# Patient Record
Sex: FEMALE | Race: BLACK OR AFRICAN AMERICAN | NOT HISPANIC OR LATINO
[De-identification: names, ages, dates, MRNs, and addresses within clinical notes are randomized per-mention and may not be internally consistent; named-entity substitution may affect disease eponyms.]

---

## 2021-04-27 PROBLEM — Z00.00 ENCOUNTER FOR PREVENTIVE HEALTH EXAMINATION: Status: ACTIVE | Noted: 2021-04-27

## 2022-06-17 ENCOUNTER — APPOINTMENT (OUTPATIENT)
Dept: ULTRASOUND IMAGING | Facility: CLINIC | Age: 57
End: 2022-06-17
Payer: COMMERCIAL

## 2022-06-17 PROCEDURE — 76641 ULTRASOUND BREAST COMPLETE: CPT | Mod: 50

## 2023-01-17 ENCOUNTER — APPOINTMENT (OUTPATIENT)
Dept: MAMMOGRAPHY | Facility: CLINIC | Age: 58
End: 2023-01-17

## 2023-01-19 ENCOUNTER — APPOINTMENT (OUTPATIENT)
Dept: MAMMOGRAPHY | Facility: CLINIC | Age: 58
End: 2023-01-19
Payer: COMMERCIAL

## 2023-01-19 PROCEDURE — 77063 BREAST TOMOSYNTHESIS BI: CPT

## 2023-01-19 PROCEDURE — 77067 SCR MAMMO BI INCL CAD: CPT

## 2023-06-13 ENCOUNTER — APPOINTMENT (OUTPATIENT)
Dept: ULTRASOUND IMAGING | Facility: CLINIC | Age: 58
End: 2023-06-13
Payer: COMMERCIAL

## 2023-06-13 PROCEDURE — 76641 ULTRASOUND BREAST COMPLETE: CPT | Mod: 50

## 2024-01-09 ENCOUNTER — APPOINTMENT (OUTPATIENT)
Dept: MAMMOGRAPHY | Facility: CLINIC | Age: 59
End: 2024-01-09
Payer: COMMERCIAL

## 2024-01-09 PROCEDURE — 77067 SCR MAMMO BI INCL CAD: CPT

## 2024-01-09 PROCEDURE — 77063 BREAST TOMOSYNTHESIS BI: CPT

## 2024-02-01 ENCOUNTER — TRANSCRIPTION ENCOUNTER (OUTPATIENT)
Age: 59
End: 2024-02-01

## 2024-02-01 NOTE — ASU PATIENT PROFILE, ADULT - NS PREOP UNDERSTANDS INFO
Spoke to patient to be NPO/No solid  foods after  12Mn, allow to drink clear liquids  till 4 am , dress comfortable, leave all valuable at home,  no lotions, no jewelry,  Bring ID photo and insurance cards,  escort arranged, address and telephone  given to patient  to call as needed./yes

## 2024-02-01 NOTE — ASU PATIENT PROFILE, ADULT - NSICDXPASTSURGICALHX_GEN_ALL_CORE_FT
PAST SURGICAL HISTORY:  No significant past surgical history PAST SURGICAL HISTORY:  H/O:      History of D&C

## 2024-02-02 ENCOUNTER — TRANSCRIPTION ENCOUNTER (OUTPATIENT)
Age: 59
End: 2024-02-02

## 2024-02-02 ENCOUNTER — OUTPATIENT (OUTPATIENT)
Dept: OUTPATIENT SERVICES | Facility: HOSPITAL | Age: 59
LOS: 1 days | Discharge: HOME | End: 2024-02-02
Payer: COMMERCIAL

## 2024-02-02 VITALS
RESPIRATION RATE: 16 BRPM | OXYGEN SATURATION: 98 % | SYSTOLIC BLOOD PRESSURE: 122 MMHG | TEMPERATURE: 98 F | HEART RATE: 74 BPM | DIASTOLIC BLOOD PRESSURE: 74 MMHG

## 2024-02-02 VITALS
HEART RATE: 73 BPM | OXYGEN SATURATION: 99 % | SYSTOLIC BLOOD PRESSURE: 142 MMHG | DIASTOLIC BLOOD PRESSURE: 94 MMHG | WEIGHT: 162.26 LBS | RESPIRATION RATE: 16 BRPM | TEMPERATURE: 98 F | HEIGHT: 62 IN

## 2024-02-02 DIAGNOSIS — Z98.891 HISTORY OF UTERINE SCAR FROM PREVIOUS SURGERY: Chronic | ICD-10-CM

## 2024-02-02 DIAGNOSIS — N84.0 POLYP OF CORPUS UTERI: ICD-10-CM

## 2024-02-02 DIAGNOSIS — Z98.890 OTHER SPECIFIED POSTPROCEDURAL STATES: Chronic | ICD-10-CM

## 2024-02-02 PROCEDURE — 88305 TISSUE EXAM BY PATHOLOGIST: CPT | Mod: 26

## 2024-02-02 RX ORDER — ONDANSETRON 8 MG/1
4 TABLET, FILM COATED ORAL ONCE
Refills: 0 | Status: DISCONTINUED | OUTPATIENT
Start: 2024-02-02 | End: 2024-02-02

## 2024-02-02 RX ORDER — SODIUM CHLORIDE 9 MG/ML
1000 INJECTION, SOLUTION INTRAVENOUS
Refills: 0 | Status: DISCONTINUED | OUTPATIENT
Start: 2024-02-02 | End: 2024-02-02

## 2024-02-02 RX ORDER — FENTANYL CITRATE 50 UG/ML
25 INJECTION INTRAVENOUS
Refills: 0 | Status: DISCONTINUED | OUTPATIENT
Start: 2024-02-02 | End: 2024-02-02

## 2024-02-02 RX ORDER — ACETAMINOPHEN 500 MG
1000 TABLET ORAL ONCE
Refills: 0 | Status: COMPLETED | OUTPATIENT
Start: 2024-02-02 | End: 2024-02-02

## 2024-02-02 RX ADMIN — Medication 1000 MILLIGRAM(S): at 13:29

## 2024-02-02 RX ADMIN — SODIUM CHLORIDE 100 MILLILITER(S): 9 INJECTION, SOLUTION INTRAVENOUS at 16:50

## 2024-02-02 NOTE — ASU DISCHARGE PLAN (ADULT/PEDIATRIC) - NS MD DC FALL RISK RISK
For information on Fall & Injury Prevention, visit: [email or web address] OR  [email or web address] OR  https://www.cdc.gov/steadi/patient.html

## 2024-02-02 NOTE — PRE-ANESTHESIA EVALUATION ADULT - NSANTHOBSERVEDRD_ENT_A_CORE
8/10/2023         RE: Per Etienne  [address] [facility] [address]        Dear Colleague,    Thank you for referring your patient, Per Etienne, to the [facility]. Please see a copy of my visit note below.    Oncology Follow-up:  Date on this visit: Aug 10, 2023    Primary care physician: Per Chen   Urologist: Dr. Angel Colmenares     Metastatic hormone sensitive prostate cancer    Oncologic History:  1. Metastatic hormone sensitive prostate cancer  He presented to his PCP in December 2019 with slow urinary stream.  PSA was checked and was found to be 124. On January 8, 2020 abdomen pelvis CT showed groundglass opacity in the left lower lobe,  dystrophic calcification of the prostate gland, and a fatty liver.  Same-day bone scan was negative. On January 10, 2010 prostate biopsy showed on the left Robyn score 4+3 involving 7 cores and on the right [place] score 3+4 involving 6 cores, with positive perineural invasion.  He proceeded to undergo RRP by Dr. Colmenares on February 24, 2020.  Pathology revealed acinar adenocarcinoma Robyn 4+3 with focal ROBERTO, bladder neck base invasion, positive SVI, positive PNI, and multiple positive margins.  There was no LVSI and 0 out of 3 lymph nodes removed were involved with tumor; pT3bN0.  He received Eligard on 4/20/2020.  MRI prostate on 5/21/2020 was concerning for local recurrence in the prostatectomy bed and pelvic lymph nodes.  He had a PET/CT F-18 fluciclovine PET/CT on 5/20/2020 which showed 2 foci of increased uptake in the left ischium, with underlying  sclerotic lesion, and symphysis pubis, additionally low-level increased uptake in 3 lymph nodes along the right external and internal iliac nodes, and prostatectomy bed was noted.    On April 21, 2020 he received an Eligard injection.    He started Apalutamide on 6/12/2020.  Bone scan 1/22/2021: no bone mets.      2. Tinnitus b/l -  By 12/2020 he presented with 2  No months c/o bilateral tinnitus and was evaluated by Dr. Shields from ENT. An audiogram showed a significant down-sloping mid to high frequency sensorineural hearing loss. There was no evidence of conductive hearing loss or significant asymmetry.  He was felt to have early onset presbycusis. He was referred for tinnitus retraining therapy.  Review of literature showed no clear association of apalutamide or Xgeva with tinnitus or ototoxicity. Felt to have early onset presbycusis.    3. Chronic lower back pain, with exacerbations- He is followed by Dr. Denise and  Dr. Clay from  pain management for evaluation of lower back pain.  He had CT of thoracic and lumbar spine on August 23, 2021.  This demonstrated unchanged sclerotic foci in T6 and T11 vertebral bodies dating back to May 2020.  There were degenerative changes in the lumbar spine with severe left neural foraminal narrowing at L4-L5 and moderate to severe at L3-L4.  He has proceeded with L3-4 epidural injection on 10/22/2021.  No acute osseous abnormalities were noted. His lower back pain and b/l pelvic pain has improved following epidural injection.     History Of Present Illness:  Mr. Etienne is a 58 year old male who presents for f/up of  metastatic hormone sensitive prostate cancer diagnosed in 01/2020, s/p RRP at that time but later found to have residual/recurrent disease in the pelvis and L inferior pubic body and pubic ramus metastasis.   He has been on androgen deprivation therapy since April 2020, and continues on Lupron every 3 months.     He started Apalutamide on 6/12/2020. He has remained on apalutamide.  He has not had any side effects attributable to apalutamide.  He has been taking his medications at the same time by the clock each day.  He has not missed a single dose that he is aware of.       He gets epidural injection to lumbar spine for back pain.    Jay has been doing well and has no new complaints at this clinic visit.  He has been  tolerating his apalutamide without any side effects.    He is driving to his cabin for start of fishing season right after our visit.    Past Medical/Surgical History:  Past Medical History:   Diagnosis Date     Gout      Hypertension goal BP (blood pressure) < 140/90 08/01/2022     Lumbar stenosis      Prostate cancer (H) 01/10/2020     Past Surgical History:   Procedure Laterality Date     BIOPSY PROSTATE TRANSRECTAL  01/10/2020    Dr. Colmenares     COLONOSCOPY  5/2020     LITHOTRIPSY  age 30s     MICROSCOPIC KNEE SURGERY Right age 30s     ORTHOPEDIC SURGERY Left 1985    Alan placed in Left Femur     PROSTATECTOMY RETROPUBIC RADICAL  02/24/2020    Dr. Colmenares     Allergies:  Allergies as of 08/10/2023     (No Known Allergies)     Current Medications:   Current Outpatient Medications   Medication Sig     aspirin (ASA) 81 MG chewable tablet Take 1 tablet (81 mg) by mouth daily     clotrimazole (LOTRIMIN) 1 % external solution 2 ear drops in the right ear 2 times daily for 7 days     denosumab (XGEVA) 120 MG/1.7ML SOLN injection Inject 1.7 mLs (120 mg) Subcutaneous every 3 months     HYDROcodone-acetaminophen (NORCO) 5-325 MG tablet Take 1 tablet by mouth 3 times daily as needed for breakthrough pain or severe pain Fill 8/10/2023 Start 8/11/2023     leuprolide (ELIGARD) 45 MG kit Inject 45 mg Subcutaneous every 6 months     lisinopril (ZESTRIL) 10 MG tablet Take 1 tablet (10 mg) by mouth daily     morphine (MS CONTIN) 15 MG CR tablet Take 1 tablet (15 mg) by mouth every 12 hours Fill 8/10/2023 Start 8/11/2023     naloxone (NARCAN) 4 MG/0.1ML nasal spray Spray 1 spray (4 mg) into one nostril alternating nostrils as needed for opioid reversal every 2-3 minutes until assistance arrives     omeprazole (PRILOSEC) 20 MG DR capsule TAKE 1 CAPSULE BY MOUTH TWICE A DAY     SENNA-docusate sodium (SENNA S) 8.6-50 MG tablet Take 1 tablet by mouth 2 times daily     UNABLE TO FIND 1 tablet daily MEDICATION NAME: C, D, Zinc combination  supplement     No current facility-administered medications for this visit.     Facility-Administered Medications Ordered in Other Visits   Medication     leuprolide (LUPRON DEPOT) kit 22.5 mg        Family History:    His father was diagnosed with prostate cancer, at the age of 75. Paternal GM had throat cancer at the age of 94, nonsmoker.     Social History:  Social History     Socioeconomic History     Marital status:    Occupational History     Not on file   Social Needs     Food insecurity     Transportation needs   Tobacco Use     Smoking status: Former Smoker     Packs/day: 1.00     Years: 30.00     Pack years: 30.00     Types: Cigarettes, Cigars     Last attempt to quit: 2012     Years since quittin.4     Smokeless tobacco: Never Used   Substance and Sexual Activity     Alcohol use: Yes     Comment: 4-6 drinks per day - patient reports beer or vodka drinks     Physical Exam:      Wt Readings from Last 5 Encounters:   08/10/23 84.6 kg (186 lb 6.4 oz)   23 84.4 kg (186 lb)   23 86.2 kg (190 lb 0.6 oz)   23 86.2 kg (190 lb 1.6 oz)   23 85.1 kg (187 lb 11.2 oz)     Constitutional: alert and in no distress  Eyes: No redness or discharge  Respiratory: No cough or labored breathing.  Musculoskeletal: Full range of motion in extremities.  Skin: no visible skin lesions or discoloration  Neurological: No tremors and denies headache.  Psychiatric: Mentation appears normal and affect is normal as well.  Alert and oriented x3.  The rest the comprehensive physical examination is deferred due to public health emergency video visit restrictions.        Laboratory/Imaging Studies  Labs reviewed.      Recent Labs   Lab Test 23  1454 23  1544 23  1519 22  1432 22  1434    139 143 140 141   POTASSIUM 4.0 4.4 4.3 4.1 4.1   CHLORIDE 103 104 109 104 106   CO2 26 30 30 29 29   ANIONGAP 12 5 4 7 6   BUN 14.2 22 17 14 17   CR 1.05 1.06 1.00 1.04 0.99   GLC  108* 121* 110* 99 97   NATALIYA 9.8 9.5 9.2 9.3 9.2     No results for input(s): MAG, PHOS in the last 99379 hours.  Recent Labs   Lab Test 08/09/23  1454 05/09/23  1544 01/31/23  1519 11/01/22  1432 07/21/22  1434   WBC 7.8 7.7 6.3 8.9 7.5   HGB 14.2 13.3 13.6 13.2* 13.7    268 246 292 234   MCV 92 93 95 94 91   NEUTROPHIL 65 62 56 71 56     Recent Labs   Lab Test 08/09/23  1454 05/09/23  1544 01/31/23  1519   BILITOTAL 0.2 0.5 0.3   ALKPHOS 77 78 67   ALT 33 31 35   AST 22 18 20   ALBUMIN 4.1 3.7 3.9     TSH   Date Value Ref Range Status   01/21/2022 1.03 0.40 - 4.00 mU/L Final   10/29/2021 1.91 0.40 - 4.00 mU/L Final   08/23/2021 2.49 0.40 - 4.00 mU/L Final   04/29/2021 2.57 0.40 - 4.00 mU/L Final   01/22/2021 3.46 0.40 - 4.00 mU/L Final   10/30/2020 1.97 0.40 - 4.00 mU/L Final     No results for input(s): CEA in the last 51479 hours.  Results for orders placed or performed in visit on 06/29/23   PAIN Interlaminar Epidural Steroid Injection Lumbar/Sacral    Narrative    Pre procedure Diagnosis: Lumbar radiculopathy  Post procedure Diagnosis: Same  Procedure performed: L4-5 interlaminar epidural steroid injection   Anesthesia: none  Complications: none  Operators: Issac Tapia MD     Indications:   Per Etienne is a 57 year old male.  The patient has a history of   lbp rad to her LE.  Examination shows neg slump.  he has tried   conservative treatment including meds/pt/injections    MRI reviewed  Options/alternatives, benefits and risks were discussed with the patient   including but not limited to bleeding, infection, no pain relief, tissue   trauma, exposure to radiation, reaction to medications, spinal cord   injury, dural puncture, weakness, numbness and headache.  Questions were   answered to his satisfaction and he wishes to proceed. Voluntary informed   consent was obtained and signed.     Vitals were reviewed: Yes  Allergies were reviewed:  Yes   Medications were reviewed:  Yes   Pre-procedure  pain score: 8/10    Procedure:  The patient's medical history, medications, and allergies were reviewed   and reconciled.  After obtaining signed informed consent, the patient was   brought into the procedure suite and was placed in a prone position on the   procedure table.   A Pause for the Cause was performed.  Patient was   prepped and draped in the usual sterile fashion.     The L4-5 interspace was identified with AP fluoroscopy.  A total of 3ml of   1% lidocaine was used to anesthetize the skin and subcutaneous tissues for   a  midline approach.    A 20gauge 3.5inch Touhy needle was advanced   utilizing intermittent AP and Lateral fluoroscopy and air for loss of   resistance.  The epidural space was encountered on the first pass without   difficulties.  Aspiration for blood and CSF was negative.  Needle position   was verified by injecting 1 ml of Omnipaque utilizing real-time   fluoroscopy that showed good needle placement and epidural spread without   signs of intravascular or intrathecal uptake.  Omnipaque wasted:  9 ml.    Then, after repeated negative aspiration for blood or CSF, a combination   of Kenalog 40 mg, Bupivicaine 0.25% 2 ml, diluted with 3 ml of normal   saline to a total injectate volume of 6 ml was injected into the epidural   space in a slow and incremental fashion and the needle was restyletted and   withdrawn.  All injected medications were preservative free.    The injection site was cleaned and a sterile dressing was applied.    The patient tolerated the procedure well without complications and was   taken to the recovery room for continued observation.    Images were saved to PACS.    Post-procedure pain score: 7/10  Follow-up includes:   -f/u with referring provider     Issac Tapia MD  [facility]       Recent Labs   Lab Test 08/09/23  1454 05/09/23  1544 01/31/23  1519 11/01/22  1432 07/21/22  1434 04/21/22  0932   PSA <0.01 <0.01 <0.01 <0.01 <0.01 <0.01    TESTOSTTOTAL  --  8* 5* 2* 4* 15*          ASSESSMENT/PLAN:  Jay is a very pleasant 56 year old man with metastatic (to the bones) prostate cancer. He is s/p Eligard on 4/21/2020. Jay has low volume hormone sensitive metastatic disease, with residual disease locoregionally and bone metastasis (two lesions- in left ischium and symphysis pubis), asymptomatic.  He received Eligard on 4/21/2020 and started on Apalutamide on 6/12/2020.    1.  Metastatic prostate cancer-PSA decreased to <0.01. Continue apalutamide daily and Lupron every 3 months.  We will continue to monitor the patient per treatment protocol. We will follow CBC differential, CMP, total testosterone level and PSA every 3 months.    He has responded extremely well so far. His PSA remains undetectable which is great news. We will continue with leuprolide, with apalutamide and denosumab as current.     2. Bone metastasis-continue Xgeva q 3 months for prevention of skeletal related events given low volume metastatic disease to the bones.      3.  Lung cancer screening- CT chest findings from 1/31/2022 negative. Next due in one year    4. Intermittent leucocytosis secondary to intermittent neutrophilia  Per tends to get intermittent leukocytosis with neutrophilia.  He had high total WBC count with absolute neutrophil counts of 16.5 thousand.  This has dropped to 4.2K-well within the normal range.  He has been getting intermittent cyclical neutrophilia.     5. Drug monitoring- TSH monitoring q 3-4 months. TSH normal in 01/2022.     6. Mild normochromic normocytic anemia- continue to follow on CBCd, likely secondary to chronic inflammation, androgen deprivation therapy, apalutamide.  He is hemoglobin values have been fluctuating since his diagnosis of prostate cancer but most values have been between 13 and 14 g/dL.    7. GI - on PPI for s/o GErD and cough in am with improvement of symptoms per GI (Brett Preston's note)- on Omeprazole PO BID. Apalutamide  associated with the following GI symptoms: Decreased appetite (12%), diarrhea (9% to 20%), nausea (18%) but not GERD s/p. Previously referred to GI due to persistent GERD symptoms and was continued by GI on Omeprazole and recommended to proceed with upper EGD for further evaluation. He did not follow through with upper EgD or  GI appt.      Video-Visit Details    Type of service:  Video Visit  Originating Location (pt. Location): Home  Distant Location (provider location):  [facility]  Platform used for Video Visit: Kwelia    25 minutes spent on the date of the encounter doing chart review, history and exam, documentation and further activities as noted above      Scott Garcia    Hematologist and Medical Oncologist  [facility]       Again, thank you for allowing me to participate in the care of your patient.        Sincerely,        Scott Garcia MD

## 2024-02-02 NOTE — PRE-ANESTHESIA EVALUATION ADULT - NSANTHALCOHOLSD_GEN_ALL_CORE
Ongoing SW/CM Assessment/Plan of Care Note     See SW/CM flowsheets for goals and other objective data.      Disposition:  Planned Discharge Destination: Rehabilitation/Skilled Care    Progress note:   Jamari reviewed chart and updated at OFT's by RN.     JAMARI sent [facility] referral to [facility] for review.          Yes

## 2024-02-02 NOTE — ASU DISCHARGE PLAN (ADULT/PEDIATRIC) - CARE PROVIDER_API CALL
Jeff Blount  Obstetrics and Gynecology  [address] [place] [address]  Phone: [phone]  Fax: [phone]  Follow Up Time:

## 2024-02-05 LAB — SURGICAL PATHOLOGY STUDY: SIGNIFICANT CHANGE UP

## 2024-06-10 PROBLEM — Z78.9 OTHER SPECIFIED HEALTH STATUS: Chronic | Status: ACTIVE | Noted: 2024-02-01

## 2024-06-11 ENCOUNTER — APPOINTMENT (OUTPATIENT)
Dept: ULTRASOUND IMAGING | Facility: CLINIC | Age: 59
End: 2024-06-11

## 2024-06-13 ENCOUNTER — APPOINTMENT (OUTPATIENT)
Dept: ULTRASOUND IMAGING | Facility: CLINIC | Age: 59
End: 2024-06-13

## 2024-06-13 PROCEDURE — 76641 ULTRASOUND BREAST COMPLETE: CPT | Mod: 50

## 2025-01-13 ENCOUNTER — APPOINTMENT (OUTPATIENT)
Dept: MAMMOGRAPHY | Facility: CLINIC | Age: 60
End: 2025-01-13

## 2025-01-13 PROCEDURE — 77063 BREAST TOMOSYNTHESIS BI: CPT

## 2025-01-13 PROCEDURE — 77067 SCR MAMMO BI INCL CAD: CPT

## 2025-01-14 ENCOUNTER — APPOINTMENT (OUTPATIENT)
Dept: MAMMOGRAPHY | Facility: CLINIC | Age: 60
End: 2025-01-14

## 2025-06-17 ENCOUNTER — APPOINTMENT (OUTPATIENT)
Dept: ULTRASOUND IMAGING | Facility: CLINIC | Age: 60
End: 2025-06-17
Payer: COMMERCIAL

## 2025-06-17 PROCEDURE — 76641 ULTRASOUND BREAST COMPLETE: CPT | Mod: 50

## (undated) DEVICE — PACK D&C

## (undated) DEVICE — DRSG PAD SANITARY OB

## (undated) DEVICE — PRESSURE INFUSOR BAG 3000ML

## (undated) DEVICE — WARMING BLANKET UPPER ADULT

## (undated) DEVICE — SOL INJ NS 0.9% 1000ML

## (undated) DEVICE — TUBING SET GRAVITY 2 SPIKE

## (undated) DEVICE — DRAPE IRRIGATION POUCH 19X23"

## (undated) DEVICE — SLV COMPRESSION KNEE MED

## (undated) DEVICE — GLV 7 PROTEXIS (WHITE)

## (undated) DEVICE — POSITIONER FOAM EGG CRATE ULNAR 2PCS (PINK)

## (undated) DEVICE — POSITIONER STRAP ARMBOARD 1.5X32" DISP